# Patient Record
Sex: MALE | Race: WHITE | NOT HISPANIC OR LATINO | Employment: FULL TIME | URBAN - METROPOLITAN AREA
[De-identification: names, ages, dates, MRNs, and addresses within clinical notes are randomized per-mention and may not be internally consistent; named-entity substitution may affect disease eponyms.]

---

## 2024-09-04 ENCOUNTER — LAB (OUTPATIENT)
Dept: FAMILY MEDICINE CLINIC | Facility: CLINIC | Age: 34
End: 2024-09-04
Payer: COMMERCIAL

## 2024-09-04 ENCOUNTER — OFFICE VISIT (OUTPATIENT)
Dept: FAMILY MEDICINE CLINIC | Facility: CLINIC | Age: 34
End: 2024-09-04
Payer: COMMERCIAL

## 2024-09-04 VITALS
OXYGEN SATURATION: 98 % | HEART RATE: 84 BPM | SYSTOLIC BLOOD PRESSURE: 154 MMHG | BODY MASS INDEX: 28.7 KG/M2 | RESPIRATION RATE: 16 BRPM | DIASTOLIC BLOOD PRESSURE: 88 MMHG | WEIGHT: 189.4 LBS | HEIGHT: 68 IN

## 2024-09-04 DIAGNOSIS — Z00.00 ENCOUNTER FOR WELLNESS EXAMINATION IN ADULT: Primary | ICD-10-CM

## 2024-09-04 DIAGNOSIS — E66.3 OVERWEIGHT (BMI 25.0-29.9): ICD-10-CM

## 2024-09-04 DIAGNOSIS — L65.9 PATCHY LOSS OF HAIR: ICD-10-CM

## 2024-09-04 DIAGNOSIS — R03.0 ELEVATED BP WITHOUT DIAGNOSIS OF HYPERTENSION: ICD-10-CM

## 2024-09-04 DIAGNOSIS — Z11.59 NEED FOR HEPATITIS C SCREENING TEST: ICD-10-CM

## 2024-09-04 DIAGNOSIS — Z13.220 SCREENING FOR HYPERLIPIDEMIA: ICD-10-CM

## 2024-09-04 DIAGNOSIS — Z13.1 SCREENING FOR DIABETES MELLITUS: ICD-10-CM

## 2024-09-04 PROCEDURE — 80048 BASIC METABOLIC PNL TOTAL CA: CPT | Performed by: STUDENT IN AN ORGANIZED HEALTH CARE EDUCATION/TRAINING PROGRAM

## 2024-09-04 PROCEDURE — 84439 ASSAY OF FREE THYROXINE: CPT | Performed by: STUDENT IN AN ORGANIZED HEALTH CARE EDUCATION/TRAINING PROGRAM

## 2024-09-04 PROCEDURE — 86803 HEPATITIS C AB TEST: CPT | Performed by: STUDENT IN AN ORGANIZED HEALTH CARE EDUCATION/TRAINING PROGRAM

## 2024-09-04 PROCEDURE — 99385 PREV VISIT NEW AGE 18-39: CPT | Performed by: STUDENT IN AN ORGANIZED HEALTH CARE EDUCATION/TRAINING PROGRAM

## 2024-09-04 PROCEDURE — 80061 LIPID PANEL: CPT | Performed by: STUDENT IN AN ORGANIZED HEALTH CARE EDUCATION/TRAINING PROGRAM

## 2024-09-04 PROCEDURE — 83036 HEMOGLOBIN GLYCOSYLATED A1C: CPT | Performed by: STUDENT IN AN ORGANIZED HEALTH CARE EDUCATION/TRAINING PROGRAM

## 2024-09-04 PROCEDURE — 36415 COLL VENOUS BLD VENIPUNCTURE: CPT | Performed by: STUDENT IN AN ORGANIZED HEALTH CARE EDUCATION/TRAINING PROGRAM

## 2024-09-04 PROCEDURE — 84443 ASSAY THYROID STIM HORMONE: CPT | Performed by: STUDENT IN AN ORGANIZED HEALTH CARE EDUCATION/TRAINING PROGRAM

## 2024-09-04 PROCEDURE — 99213 OFFICE O/P EST LOW 20 MIN: CPT | Performed by: STUDENT IN AN ORGANIZED HEALTH CARE EDUCATION/TRAINING PROGRAM

## 2024-09-04 NOTE — PROGRESS NOTES
"Chief Complaint  Chief Complaint   Patient presents with    Establish Care    Hair/Scalp Problem     R neck    Fatigue       Subjective        Jez Ruelas is a 33 y.o. male who presents to [facility].  History of Present Illness    Will is a 34 yo male here for wellness as well as hair loss.      Wellness  Diet: mostly home-cooked meals, eats out once/week  Exercise: walks a lot at work; previously went to gym regularly but not since having child 10 months ago  Smoking: former smoker (1ppd x 14 years, quit October 2023)  ETOH: 1-2 beers/night on weekdays and 4-5 beers/day on weekends      Hair loss  Noticed a patch of missing hair on the right side of his beard (near the mandibular angle) approximately 6 to 7 months ago  Denies any itching or rash  No hair loss anywhere else      Objective   /88   Pulse 84   Resp 16   Ht 172.7 cm (68\")   Wt 85.9 kg (189 lb 6.4 oz)   SpO2 98%   BMI 28.80 kg/m²     Estimated body mass index is 28.8 kg/m² as calculated from the following:    Height as of this encounter: 172.7 cm (68\").    Weight as of this encounter: 85.9 kg (189 lb 6.4 oz).     Physical Exam   GEN: In no acute distress, non toxic appearing  HEENT: EOMI. Mucous membranes moist. Oropharynx without erythema or exudate.   CV: Regular rate and rhythm, no murmurs. No extremity edema.   RESP: Lungs clear to auscultation bilaterally.  No signs of respiratory distress on room air.  SKIN: No rashes.  Circular patch ~3-4 cm in diameter of missing hair in the right mandibular angle.   MSK: No joint erythema, deformity, or effusion. Good ROM in upper and lower extremities.  NEURO: AAO to person, place, and time. CN 2-12 intact grossly.   PSYCH: Affect normal, insight fair     PHQ-2 Depression Screening  Little interest or pleasure in doing things? 0-->not at all   Feeling down, depressed, or hopeless? 0-->not at all   PHQ-2 Total Score 0      Result Review :         Assessment and " Plan     Diagnoses and all orders for this visit:    1. Encounter for wellness examination in adult (Primary)  2. Overweight (BMI 25.0-29.9)  Overall doing well.  Routine screening labs performed given weight and family history    BMI is >= 25 and <30. (Overweight) The following options were offered after discussion;: exercise counseling/recommendations and nutrition counseling/recommendations    Encouraged healthy lifestyle choices such as healthy diet choices (low carbohydrates, increase fruits/vegetables, less processed foods, limiting portion sizes) as well as increasing physical activity with goal of 150 minutes of moderate intensity exercise per week.      3. Elevated BP without diagnosis of hypertension  Blood pressure significantly elevated in office today at 154/88  Patient states that he checks blood pressure periodically at home (wife is RN) and is occasionally high but not typically    Patient advised to monitor blood pressure consistently over the next few weeks and if >140/90 we will call office or send message on [employer].  The blood pressure readings are normal can space out monitoring to a few times each month.    Discussed nonpharmacologic options to help with lowering blood pressure such as decreasing sodium intake, increasing cardiac exercise, and losing weight.    4. Patchy loss of hair  One patch of missing hair in patient's beard.  Unclear etiology.  Will obtain TSH and free T4 to evaluate for hypothyroidism, especially given patient's concomitant fatigue he has had at times.  If thyroid levels normal will refer to dermatology for further evaluation.    -     TSH  -     T4, free    5. Screening for diabetes mellitus  -     Basic Metabolic Panel  -     Hemoglobin A1c    6. Screening for hyperlipidemia  -     Lipid Panel    7. Need for hepatitis C screening test  -     Hepatitis C Antibody            Follow Up     Return in about 6 months (around 3/4/2025) for Recheck.

## 2024-09-05 LAB
ANION GAP SERPL CALCULATED.3IONS-SCNC: 14 MMOL/L (ref 5–15)
BUN SERPL-MCNC: 11 MG/DL (ref 6–20)
BUN/CREAT SERPL: 13.3 (ref 7–25)
CALCIUM SPEC-SCNC: 9.7 MG/DL (ref 8.6–10.5)
CHLORIDE SERPL-SCNC: 98 MMOL/L (ref 98–107)
CHOLEST SERPL-MCNC: 258 MG/DL (ref 0–200)
CO2 SERPL-SCNC: 24 MMOL/L (ref 22–29)
CREAT SERPL-MCNC: 0.83 MG/DL (ref 0.76–1.27)
EGFRCR SERPLBLD CKD-EPI 2021: 119.3 ML/MIN/1.73
GLUCOSE SERPL-MCNC: 73 MG/DL (ref 65–99)
HBA1C MFR BLD: 5.4 % (ref 4.8–5.6)
HCV AB SER QL: NORMAL
HDLC SERPL-MCNC: 42 MG/DL (ref 40–60)
LDLC SERPL CALC-MCNC: 174 MG/DL (ref 0–100)
LDLC/HDLC SERPL: 4.08 {RATIO}
POTASSIUM SERPL-SCNC: 3.9 MMOL/L (ref 3.5–5.2)
SODIUM SERPL-SCNC: 136 MMOL/L (ref 136–145)
T4 FREE SERPL-MCNC: 1.13 NG/DL (ref 0.92–1.68)
TRIGL SERPL-MCNC: 224 MG/DL (ref 0–150)
TSH SERPL DL<=0.05 MIU/L-ACNC: 3.8 UIU/ML (ref 0.27–4.2)
VLDLC SERPL-MCNC: 42 MG/DL (ref 5–40)

## 2024-09-06 PROBLEM — E78.2 MIXED HYPERLIPIDEMIA: Status: ACTIVE | Noted: 2024-09-06

## 2025-03-07 ENCOUNTER — OFFICE VISIT (OUTPATIENT)
Dept: FAMILY MEDICINE CLINIC | Facility: CLINIC | Age: 35
End: 2025-03-07
Payer: COMMERCIAL

## 2025-03-07 VITALS
SYSTOLIC BLOOD PRESSURE: 142 MMHG | HEART RATE: 89 BPM | HEIGHT: 68 IN | OXYGEN SATURATION: 95 % | WEIGHT: 194.8 LBS | RESPIRATION RATE: 18 BRPM | DIASTOLIC BLOOD PRESSURE: 82 MMHG | BODY MASS INDEX: 29.52 KG/M2

## 2025-03-07 DIAGNOSIS — E78.2 MIXED HYPERLIPIDEMIA: ICD-10-CM

## 2025-03-07 DIAGNOSIS — L65.9 PATCHY LOSS OF HAIR: ICD-10-CM

## 2025-03-07 DIAGNOSIS — R03.0 WHITE COAT SYNDROME WITHOUT DIAGNOSIS OF HYPERTENSION: Primary | ICD-10-CM

## 2025-03-07 PROCEDURE — 99213 OFFICE O/P EST LOW 20 MIN: CPT | Performed by: STUDENT IN AN ORGANIZED HEALTH CARE EDUCATION/TRAINING PROGRAM

## 2025-03-07 NOTE — PROGRESS NOTES
"Chief Complaint  Chief Complaint   Patient presents with    Hyperlipidemia     6 month follow up       Subjective        Jez Ruelas is a 34 y.o. male who presents to [facility].  History of Present Illness    Will is a 34-year-old male here for chronic condition follow-up.    Elevated blood pressure  At last appointment 9/4 and blood pressure was significantly elevated so patient advised to monitor at home  He states blood pressure readings have been averaging 120s over 85  Rarely gets above 140 systolic      Hyperlipidemia  Reviewed lipid panel from previous appointment  Denies problem with cholesterol in the past      Hair loss  Patch of hair loss over his right jaw within the beard  Has not changed in size since last appointment  Denies any itching    Objective   /82   Pulse 89   Resp 18   Ht 172.7 cm (67.99\")   Wt 88.4 kg (194 lb 12.8 oz)   SpO2 95%   BMI 29.63 kg/m²     Estimated body mass index is 29.63 kg/m² as calculated from the following:    Height as of this encounter: 172.7 cm (67.99\").    Weight as of this encounter: 88.4 kg (194 lb 12.8 oz).     Physical Exam   GEN: In no acute distress, non toxic appearing  HEENT: MMM. EOMI.   CV: No extremity edema.   RESP: No signs of respiratory distress.  SKIN: No rashes.  There is a 2-3 cm area of hair loss along the right jawline; no obvious associated rash.  MSK: No deformity.   NEURO: Moves all extremities equally. Alert and appropriate          PHQ-2 Depression Screening  Little interest or pleasure in doing things? Not at all   Feeling down, depressed, or hopeless? Not at all   PHQ-2 Total Score 0         Result Review :              Assessment and Plan     Diagnoses and all orders for this visit:    1. White coat syndrome without diagnosis of hypertension (Primary)  Blood pressure is mildly elevated today at 142/82  Blood pressure readings at home overall sound great  No concerns at this time, continue to " monitor periodically at home    2. Mixed hyperlipidemia  Reviewed lipid panel results  Discussed ways to improve his lipid panel including diet, exercise, weight loss  If abnormalities persist at some point he may need to start statin medication    3. Patchy loss of hair  No obvious etiology from previous labs  Will refer to dermatology for further evaluation and management    -     Ambulatory Referral to Dermatology        Follow Up     Return in about 6 months (around 9/7/2025) for Annual physical.

## 2025-07-17 ENCOUNTER — HOSPITAL ENCOUNTER (EMERGENCY)
Facility: HOSPITAL | Age: 35
Discharge: HOME | End: 2025-07-17
Payer: COMMERCIAL

## 2025-07-17 VITALS
HEART RATE: 86 BPM | DIASTOLIC BLOOD PRESSURE: 101 MMHG | WEIGHT: 186.29 LBS | RESPIRATION RATE: 18 BRPM | SYSTOLIC BLOOD PRESSURE: 152 MMHG | HEIGHT: 68 IN | TEMPERATURE: 97.2 F | BODY MASS INDEX: 28.23 KG/M2 | OXYGEN SATURATION: 97 %

## 2025-07-17 DIAGNOSIS — S61.213A LACERATION OF LEFT MIDDLE FINGER WITHOUT FOREIGN BODY WITHOUT DAMAGE TO NAIL, INITIAL ENCOUNTER: Primary | ICD-10-CM

## 2025-07-17 PROCEDURE — 99283 EMERGENCY DEPT VISIT LOW MDM: CPT

## 2025-07-17 NOTE — ED PROVIDER NOTES
"Subjective   History of Present Illness  Chief complaint: Finger laceration      Context: Patient is a 34-year-old male who presents after he cut his finger left hand middle finger.  No nailbed injury.  States he was at work.  He denies any paresthesias or weakness.  No nailbed injury.  States it \"barely caught it and I just wanted to get some glue.\"        PCP:              Review of Systems   Constitutional:  Negative for fever.       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       No Known Allergies    Past Surgical History:   Procedure Laterality Date    INGUINAL HERNIA REPAIR  92    Fixed when i had kidney surgery as well       Family History   Problem Relation Age of Onset    Arthritis Mother     Arthritis Father     Hyperlipidemia Father     Thyroid disease Father     Alcohol abuse Paternal Grandfather        Social History     Socioeconomic History    Marital status: Significant Other   Tobacco Use    Smoking status: Former     Current packs/day: 0.00     Average packs/day: 1.5 packs/day for 13.0 years (19.5 ttl pk-yrs)     Types: Cigarettes     Start date: 2010     Quit date: 10/20/2023     Years since quittin.7     Passive exposure: Past    Smokeless tobacco: Never   Vaping Use    Vaping status: Never Used   Substance and Sexual Activity    Alcohol use: Yes     Alcohol/week: 10.0 standard drinks of alcohol     Types: 10 Cans of beer per week     Comment: occ    Drug use: Never    Sexual activity: Yes     Partners: Female     Birth control/protection: Condom           Objective   Physical Exam.    Vital signs and traige nurse note reviewed.  Constitutional:  Awake, alert; well developed and well nourished.  No acute distress, the patient is examined in hospital gown.  HEENT:  Normocephalic, " "atraumatic;  with intact EOM; oropharynx is pink and moist without edema or erythema.  Neck: Supple, full range of motion without pain;   Cardiovascular: Regular rate and rhythm,    Pulmonary: Respiratory effort regular, nonlabored;   Musculoskeletal: 2 cm laceration noted to the left hand middle finger distal phalanx without nailbed involvement superficial and entire depth was visualized.  FDP and FDS intact.  Distal vascular and sensorimotor intact.  Neuro: Alert oriented x3, speech is clear and appropriate.      Procedures           ED Course                                         Labs Reviewed - No data to display  Medications - No data to display  No radiology results for the last day  Prior to Admission medications    Not on File                   Medical Decision Making      BP (!) 152/101   Pulse 86   Temp 97.2 °F (36.2 °C) (Oral)   Resp 18   Ht 172.7 cm (68\")   Wt 84.5 kg (186 lb 4.6 oz)   SpO2 97%   BMI 28.33 kg/m²           Radiology interpretation:  X-rays not felt to be emergently warranted            Appropriate PPE worn during exam.  Tetanus within the last 10 years.  Patient's wound is relatively superficial and we discussed closure methods.  He is politely declining sutures.  Glue was applied along with finger splint.  Discussed infection precautions and when to return emergently to the ER and close follow-up with PCP for wound reevaluation management.  No bony tenderness.      i discussed findings with patient who voices understanding of discharge instructions, signs and symptoms requiring return to ED; discharged improved and in stable condition with follow up for re-evaluation.  This document is intended for medical expert use only. Reading of this document by patients and/or patient's family without participating medical staff guidance may result in misinterpretation and unintended morbidity.  Any interpretation of such data is the responsibility of the patient and/or family member " responsible for the patient in concert with their primary or specialist providers, not to be left for sources of online searches such as [employer], [employer] or similar queries. Relying on these approaches to knowledge may result in misinterpretation, misguided goals of care and even death should patients or family members try recommendations outside of the realm of professional medical care in a supervised inpatient environment.         Problems Addressed:  Laceration of left middle finger without foreign body without damage to nail, initial encounter: acute illness or injury        Final diagnoses:   Laceration of left middle finger without foreign body without damage to nail, initial encounter       ED Disposition  ED Disposition       ED Disposition   Discharge    Condition   Stable    Comment   --               Karthikeyan Ravi DO  [address] [facility]  [address]  [place] [id number] [phone]    Schedule an appointment as soon as possible for a visit   For wound re-check         Medication List      No changes were made to your prescriptions during this visit.            Jami Montoya, APRN  07/17/25 1208